# Patient Record
Sex: FEMALE | Race: WHITE | NOT HISPANIC OR LATINO
[De-identification: names, ages, dates, MRNs, and addresses within clinical notes are randomized per-mention and may not be internally consistent; named-entity substitution may affect disease eponyms.]

---

## 2018-07-12 ENCOUNTER — TRANSCRIPTION ENCOUNTER (OUTPATIENT)
Age: 68
End: 2018-07-12

## 2018-07-12 ENCOUNTER — INPATIENT (INPATIENT)
Facility: HOSPITAL | Age: 68
LOS: 4 days | Discharge: HOME | DRG: 340 | End: 2018-07-17
Attending: SPECIALIST | Admitting: SPECIALIST
Payer: MEDICARE

## 2018-07-12 VITALS
OXYGEN SATURATION: 97 % | TEMPERATURE: 99 F | WEIGHT: 164.91 LBS | DIASTOLIC BLOOD PRESSURE: 79 MMHG | RESPIRATION RATE: 16 BRPM | HEART RATE: 86 BPM | SYSTOLIC BLOOD PRESSURE: 144 MMHG

## 2018-07-12 LAB
ALBUMIN SERPL ELPH-MCNC: 3.5 G/DL — SIGNIFICANT CHANGE UP (ref 3.5–5)
ALP SERPL-CCNC: 87 U/L — SIGNIFICANT CHANGE UP (ref 40–120)
ALT FLD-CCNC: 19 U/L DA — SIGNIFICANT CHANGE UP (ref 10–60)
AMYLASE P1 CFR SERPL: 105 U/L — SIGNIFICANT CHANGE UP (ref 25–115)
ANION GAP SERPL CALC-SCNC: 7 MMOL/L — SIGNIFICANT CHANGE UP (ref 5–17)
APPEARANCE UR: CLEAR — SIGNIFICANT CHANGE UP
AST SERPL-CCNC: 19 U/L — SIGNIFICANT CHANGE UP (ref 10–40)
BACTERIA # UR AUTO: ABNORMAL /HPF
BASOPHILS # BLD AUTO: 0 K/UL — SIGNIFICANT CHANGE UP (ref 0–0.2)
BASOPHILS NFR BLD AUTO: 0.3 % — SIGNIFICANT CHANGE UP (ref 0–2)
BILIRUB SERPL-MCNC: 0.6 MG/DL — SIGNIFICANT CHANGE UP (ref 0.2–1.2)
BILIRUB UR-MCNC: NEGATIVE — SIGNIFICANT CHANGE UP
BUN SERPL-MCNC: 13 MG/DL — SIGNIFICANT CHANGE UP (ref 7–18)
CALCIUM SERPL-MCNC: 8.4 MG/DL — SIGNIFICANT CHANGE UP (ref 8.4–10.5)
CHLORIDE SERPL-SCNC: 106 MMOL/L — SIGNIFICANT CHANGE UP (ref 96–108)
CO2 SERPL-SCNC: 25 MMOL/L — SIGNIFICANT CHANGE UP (ref 22–31)
COLOR SPEC: YELLOW — SIGNIFICANT CHANGE UP
CREAT SERPL-MCNC: 1.06 MG/DL — SIGNIFICANT CHANGE UP (ref 0.5–1.3)
DIFF PNL FLD: ABNORMAL
EOSINOPHIL # BLD AUTO: 0 K/UL — SIGNIFICANT CHANGE UP (ref 0–0.5)
EOSINOPHIL NFR BLD AUTO: 0.1 % — SIGNIFICANT CHANGE UP (ref 0–6)
EPI CELLS # UR: SIGNIFICANT CHANGE UP /HPF
GLUCOSE SERPL-MCNC: 140 MG/DL — HIGH (ref 70–99)
GLUCOSE UR QL: NEGATIVE — SIGNIFICANT CHANGE UP
HCT VFR BLD CALC: 39.4 % — SIGNIFICANT CHANGE UP (ref 34.5–45)
HGB BLD-MCNC: 12.9 G/DL — SIGNIFICANT CHANGE UP (ref 11.5–15.5)
KETONES UR-MCNC: ABNORMAL
LEUKOCYTE ESTERASE UR-ACNC: ABNORMAL
LIDOCAIN IGE QN: 145 U/L — SIGNIFICANT CHANGE UP (ref 73–393)
LYMPHOCYTES # BLD AUTO: 1.3 K/UL — SIGNIFICANT CHANGE UP (ref 1–3.3)
LYMPHOCYTES # BLD AUTO: 10.8 % — LOW (ref 13–44)
MCHC RBC-ENTMCNC: 28.8 PG — SIGNIFICANT CHANGE UP (ref 27–34)
MCHC RBC-ENTMCNC: 32.6 GM/DL — SIGNIFICANT CHANGE UP (ref 32–36)
MCV RBC AUTO: 88.4 FL — SIGNIFICANT CHANGE UP (ref 80–100)
MONOCYTES # BLD AUTO: 1.1 K/UL — HIGH (ref 0–0.9)
MONOCYTES NFR BLD AUTO: 8.6 % — SIGNIFICANT CHANGE UP (ref 2–14)
NEUTROPHILS # BLD AUTO: 10 K/UL — HIGH (ref 1.8–7.4)
NEUTROPHILS NFR BLD AUTO: 80.1 % — HIGH (ref 43–77)
NITRITE UR-MCNC: NEGATIVE — SIGNIFICANT CHANGE UP
PH UR: 6 — SIGNIFICANT CHANGE UP (ref 5–8)
PLATELET # BLD AUTO: 160 K/UL — SIGNIFICANT CHANGE UP (ref 150–400)
POTASSIUM SERPL-MCNC: 3.8 MMOL/L — SIGNIFICANT CHANGE UP (ref 3.5–5.3)
POTASSIUM SERPL-SCNC: 3.8 MMOL/L — SIGNIFICANT CHANGE UP (ref 3.5–5.3)
PROT SERPL-MCNC: 7.3 G/DL — SIGNIFICANT CHANGE UP (ref 6–8.3)
PROT UR-MCNC: NEGATIVE — SIGNIFICANT CHANGE UP
RBC # BLD: 4.46 M/UL — SIGNIFICANT CHANGE UP (ref 3.8–5.2)
RBC # FLD: 14.2 % — SIGNIFICANT CHANGE UP (ref 10.3–14.5)
RBC CASTS # UR COMP ASSIST: ABNORMAL /HPF (ref 0–2)
SODIUM SERPL-SCNC: 138 MMOL/L — SIGNIFICANT CHANGE UP (ref 135–145)
SP GR SPEC: 1.01 — SIGNIFICANT CHANGE UP (ref 1.01–1.02)
UROBILINOGEN FLD QL: NEGATIVE — SIGNIFICANT CHANGE UP
WBC # BLD: 12.5 K/UL — HIGH (ref 3.8–10.5)
WBC # FLD AUTO: 12.5 K/UL — HIGH (ref 3.8–10.5)
WBC UR QL: SIGNIFICANT CHANGE UP /HPF (ref 0–5)

## 2018-07-12 RX ORDER — KETOROLAC TROMETHAMINE 30 MG/ML
30 SYRINGE (ML) INJECTION ONCE
Qty: 0 | Refills: 0 | Status: DISCONTINUED | OUTPATIENT
Start: 2018-07-12 | End: 2018-07-12

## 2018-07-12 RX ORDER — SODIUM CHLORIDE 9 MG/ML
3 INJECTION INTRAMUSCULAR; INTRAVENOUS; SUBCUTANEOUS ONCE
Qty: 0 | Refills: 0 | Status: COMPLETED | OUTPATIENT
Start: 2018-07-12 | End: 2018-07-12

## 2018-07-12 RX ADMIN — Medication 30 MILLIGRAM(S): at 22:04

## 2018-07-12 RX ADMIN — SODIUM CHLORIDE 3 MILLILITER(S): 9 INJECTION INTRAMUSCULAR; INTRAVENOUS; SUBCUTANEOUS at 21:32

## 2018-07-12 RX ADMIN — Medication 30 MILLIGRAM(S): at 22:41

## 2018-07-12 NOTE — ED PROVIDER NOTE - PMH
Anxiety    Depression    Diabetes  pt states borderline  GERD (gastroesophageal reflux disease)    Graves disease  tx early 1980s  Hypercholesterolemia  pt states improvement since becoming a vegan  Hypothyroidism  dx 1980 s ; pt states last labs 6/14  Kidney stone  right ; noted recent sonogram ; pt denies c/o  Osteopenia    Spinal stenosis    Tuberculosis  childhood

## 2018-07-12 NOTE — ED PROVIDER NOTE - PSH
H/O left breast biopsy  9/11/2001 pt states "benign"  H/O lipoma  uopper back 1995  S/P D&C (status post dilation and curettage)  1990 s

## 2018-07-12 NOTE — ED ADULT NURSE NOTE - OBJECTIVE STATEMENT
Pt stated," I have pain on my RLQ since yesterday." Pt giving medications per order. Pt not in distress. Denies nausea , vomiting, diarrhea.

## 2018-07-12 NOTE — ED PROVIDER NOTE - OBJECTIVE STATEMENT
68 y/o F pt w/ PMHx of depression, anxiety, DM, tuberculosis, hypothyroidism presents c/o RLQ pain x yesterday. Pain is sharp, constant. Hurts when walking. Associated decreased PO intake. Pt is on zoloft, lithium, synthroid. Denies any other complaints. Allergic to Augmentin, codeine, sulfa.

## 2018-07-13 ENCOUNTER — RESULT REVIEW (OUTPATIENT)
Age: 68
End: 2018-07-13

## 2018-07-13 DIAGNOSIS — K35.80 UNSPECIFIED ACUTE APPENDICITIS: ICD-10-CM

## 2018-07-13 LAB
ANION GAP SERPL CALC-SCNC: 7 MMOL/L — SIGNIFICANT CHANGE UP (ref 5–17)
APTT BLD: 31.7 SEC — SIGNIFICANT CHANGE UP (ref 27.5–37.4)
BASOPHILS # BLD AUTO: 0 K/UL — SIGNIFICANT CHANGE UP (ref 0–0.2)
BASOPHILS NFR BLD AUTO: 0.2 % — SIGNIFICANT CHANGE UP (ref 0–2)
BLD GP AB SCN SERPL QL: SIGNIFICANT CHANGE UP
BUN SERPL-MCNC: 9 MG/DL — SIGNIFICANT CHANGE UP (ref 7–18)
CALCIUM SERPL-MCNC: 8.1 MG/DL — LOW (ref 8.4–10.5)
CHLORIDE SERPL-SCNC: 111 MMOL/L — HIGH (ref 96–108)
CO2 SERPL-SCNC: 24 MMOL/L — SIGNIFICANT CHANGE UP (ref 22–31)
CREAT SERPL-MCNC: 1.01 MG/DL — SIGNIFICANT CHANGE UP (ref 0.5–1.3)
EOSINOPHIL # BLD AUTO: 0 K/UL — SIGNIFICANT CHANGE UP (ref 0–0.5)
EOSINOPHIL NFR BLD AUTO: 0 % — SIGNIFICANT CHANGE UP (ref 0–6)
GLUCOSE SERPL-MCNC: 181 MG/DL — HIGH (ref 70–99)
HCT VFR BLD CALC: 36.3 % — SIGNIFICANT CHANGE UP (ref 34.5–45)
HGB BLD-MCNC: 11.8 G/DL — SIGNIFICANT CHANGE UP (ref 11.5–15.5)
INR BLD: 1.29 RATIO — HIGH (ref 0.88–1.16)
LYMPHOCYTES # BLD AUTO: 0.4 K/UL — LOW (ref 1–3.3)
LYMPHOCYTES # BLD AUTO: 3.8 % — LOW (ref 13–44)
MCHC RBC-ENTMCNC: 28.8 PG — SIGNIFICANT CHANGE UP (ref 27–34)
MCHC RBC-ENTMCNC: 32.5 GM/DL — SIGNIFICANT CHANGE UP (ref 32–36)
MCV RBC AUTO: 88.8 FL — SIGNIFICANT CHANGE UP (ref 80–100)
MONOCYTES # BLD AUTO: 0.5 K/UL — SIGNIFICANT CHANGE UP (ref 0–0.9)
MONOCYTES NFR BLD AUTO: 4.4 % — SIGNIFICANT CHANGE UP (ref 2–14)
NEUTROPHILS # BLD AUTO: 10.8 K/UL — HIGH (ref 1.8–7.4)
NEUTROPHILS NFR BLD AUTO: 91.6 % — HIGH (ref 43–77)
PLATELET # BLD AUTO: 134 K/UL — LOW (ref 150–400)
POTASSIUM SERPL-MCNC: 3.7 MMOL/L — SIGNIFICANT CHANGE UP (ref 3.5–5.3)
POTASSIUM SERPL-SCNC: 3.7 MMOL/L — SIGNIFICANT CHANGE UP (ref 3.5–5.3)
PROTHROM AB SERPL-ACNC: 14.1 SEC — HIGH (ref 9.8–12.7)
RBC # BLD: 4.09 M/UL — SIGNIFICANT CHANGE UP (ref 3.8–5.2)
RBC # FLD: 14 % — SIGNIFICANT CHANGE UP (ref 10.3–14.5)
SODIUM SERPL-SCNC: 142 MMOL/L — SIGNIFICANT CHANGE UP (ref 135–145)
WBC # BLD: 11.8 K/UL — HIGH (ref 3.8–10.5)
WBC # FLD AUTO: 11.8 K/UL — HIGH (ref 3.8–10.5)

## 2018-07-13 PROCEDURE — 44970 LAPAROSCOPY APPENDECTOMY: CPT | Mod: AS

## 2018-07-13 PROCEDURE — 99285 EMERGENCY DEPT VISIT HI MDM: CPT | Mod: 25

## 2018-07-13 PROCEDURE — 74177 CT ABD & PELVIS W/CONTRAST: CPT | Mod: 26

## 2018-07-13 RX ORDER — METRONIDAZOLE 500 MG
500 TABLET ORAL ONCE
Qty: 0 | Refills: 0 | Status: COMPLETED | OUTPATIENT
Start: 2018-07-13 | End: 2018-07-13

## 2018-07-13 RX ORDER — SODIUM CHLORIDE 9 MG/ML
1000 INJECTION, SOLUTION INTRAVENOUS
Qty: 0 | Refills: 0 | Status: DISCONTINUED | OUTPATIENT
Start: 2018-07-13 | End: 2018-07-17

## 2018-07-13 RX ORDER — CIPROFLOXACIN LACTATE 400MG/40ML
400 VIAL (ML) INTRAVENOUS EVERY 12 HOURS
Qty: 0 | Refills: 0 | Status: DISCONTINUED | OUTPATIENT
Start: 2018-07-13 | End: 2018-07-16

## 2018-07-13 RX ORDER — METRONIDAZOLE 500 MG
500 TABLET ORAL EVERY 8 HOURS
Qty: 0 | Refills: 0 | Status: DISCONTINUED | OUTPATIENT
Start: 2018-07-13 | End: 2018-07-16

## 2018-07-13 RX ORDER — ACETAMINOPHEN 500 MG
1000 TABLET ORAL ONCE
Qty: 0 | Refills: 0 | Status: COMPLETED | OUTPATIENT
Start: 2018-07-13 | End: 2018-07-13

## 2018-07-13 RX ORDER — KETOROLAC TROMETHAMINE 30 MG/ML
30 SYRINGE (ML) INJECTION EVERY 6 HOURS
Qty: 0 | Refills: 0 | Status: DISCONTINUED | OUTPATIENT
Start: 2018-07-13 | End: 2018-07-17

## 2018-07-13 RX ORDER — FENTANYL CITRATE 50 UG/ML
25 INJECTION INTRAVENOUS
Qty: 0 | Refills: 0 | Status: DISCONTINUED | OUTPATIENT
Start: 2018-07-13 | End: 2018-07-13

## 2018-07-13 RX ORDER — LITHIUM CARBONATE 300 MG/1
450 TABLET, EXTENDED RELEASE ORAL AT BEDTIME
Qty: 0 | Refills: 0 | Status: DISCONTINUED | OUTPATIENT
Start: 2018-07-13 | End: 2018-07-17

## 2018-07-13 RX ORDER — METRONIDAZOLE 500 MG
500 TABLET ORAL EVERY 8 HOURS
Qty: 0 | Refills: 0 | Status: DISCONTINUED | OUTPATIENT
Start: 2018-07-13 | End: 2018-07-13

## 2018-07-13 RX ORDER — CALCIUM CARBONATE 500(1250)
1 TABLET ORAL
Qty: 0 | Refills: 0 | Status: DISCONTINUED | OUTPATIENT
Start: 2018-07-13 | End: 2018-07-17

## 2018-07-13 RX ORDER — KETOROLAC TROMETHAMINE 30 MG/ML
30 SYRINGE (ML) INJECTION EVERY 6 HOURS
Qty: 0 | Refills: 0 | Status: DISCONTINUED | OUTPATIENT
Start: 2018-07-13 | End: 2018-07-13

## 2018-07-13 RX ORDER — LEVOTHYROXINE SODIUM 125 MCG
150 TABLET ORAL DAILY
Qty: 0 | Refills: 0 | Status: DISCONTINUED | OUTPATIENT
Start: 2018-07-13 | End: 2018-07-17

## 2018-07-13 RX ORDER — CIPROFLOXACIN LACTATE 400MG/40ML
400 VIAL (ML) INTRAVENOUS ONCE
Qty: 0 | Refills: 0 | Status: COMPLETED | OUTPATIENT
Start: 2018-07-13 | End: 2018-07-13

## 2018-07-13 RX ORDER — ACETAMINOPHEN 500 MG
650 TABLET ORAL EVERY 6 HOURS
Qty: 0 | Refills: 0 | Status: DISCONTINUED | OUTPATIENT
Start: 2018-07-13 | End: 2018-07-17

## 2018-07-13 RX ORDER — ONDANSETRON 8 MG/1
4 TABLET, FILM COATED ORAL EVERY 6 HOURS
Qty: 0 | Refills: 0 | Status: DISCONTINUED | OUTPATIENT
Start: 2018-07-13 | End: 2018-07-17

## 2018-07-13 RX ORDER — HEPARIN SODIUM 5000 [USP'U]/ML
5000 INJECTION INTRAVENOUS; SUBCUTANEOUS EVERY 8 HOURS
Qty: 0 | Refills: 0 | Status: DISCONTINUED | OUTPATIENT
Start: 2018-07-13 | End: 2018-07-17

## 2018-07-13 RX ORDER — SERTRALINE 25 MG/1
150 TABLET, FILM COATED ORAL DAILY
Qty: 0 | Refills: 0 | Status: DISCONTINUED | OUTPATIENT
Start: 2018-07-13 | End: 2018-07-17

## 2018-07-13 RX ORDER — DEXTROSE MONOHYDRATE, SODIUM CHLORIDE, AND POTASSIUM CHLORIDE 50; .745; 4.5 G/1000ML; G/1000ML; G/1000ML
1000 INJECTION, SOLUTION INTRAVENOUS
Qty: 0 | Refills: 0 | Status: DISCONTINUED | OUTPATIENT
Start: 2018-07-13 | End: 2018-07-17

## 2018-07-13 RX ADMIN — HEPARIN SODIUM 5000 UNIT(S): 5000 INJECTION INTRAVENOUS; SUBCUTANEOUS at 21:22

## 2018-07-13 RX ADMIN — Medication 1000 MILLIGRAM(S): at 07:23

## 2018-07-13 RX ADMIN — Medication 400 MILLIGRAM(S): at 06:50

## 2018-07-13 RX ADMIN — Medication 500 MILLIGRAM(S): at 21:22

## 2018-07-13 RX ADMIN — HEPARIN SODIUM 5000 UNIT(S): 5000 INJECTION INTRAVENOUS; SUBCUTANEOUS at 05:18

## 2018-07-13 RX ADMIN — Medication 200 MILLIGRAM(S): at 03:44

## 2018-07-13 RX ADMIN — DEXTROSE MONOHYDRATE, SODIUM CHLORIDE, AND POTASSIUM CHLORIDE 125 MILLILITER(S): 50; .745; 4.5 INJECTION, SOLUTION INTRAVENOUS at 06:35

## 2018-07-13 RX ADMIN — Medication 1 TABLET(S): at 17:14

## 2018-07-13 RX ADMIN — HEPARIN SODIUM 5000 UNIT(S): 5000 INJECTION INTRAVENOUS; SUBCUTANEOUS at 17:14

## 2018-07-13 RX ADMIN — Medication 200 MILLIGRAM(S): at 17:15

## 2018-07-13 RX ADMIN — Medication 100 MILLIGRAM(S): at 05:19

## 2018-07-13 RX ADMIN — Medication 400 MILLIGRAM(S): at 12:35

## 2018-07-13 NOTE — H&P ADULT - HISTORY OF PRESENT ILLNESS
66 y/o female with h/o stated "parathyroid" surgery; on zoloft, synthroid and lithium at home; denies any abd surgery  c/o R sided abd pain since Wed night which has remained constant with fever at home; + nausea, no vomiting or diarrhea    < from: CT Abdomen and Pelvis w/ IV Cont (07.13.18 @ 01:27) >  GI tract: Appendix is dilated measuring up to 1.1 cm and fluid-filled   with thickened wall. Associated adjacentextensive periappendiceal   stranding and trace fluid in right lower quadrant, compatible with acute   appendicitis. Appendicolith identified at its base. There are tiny   punctate bubbles of air adjacent to the distal portion of the appendix as   best seen on coronal image 34-35 of series 602, possibility of perforated   appendicitis considered in the differential. No discrete rim-enhancing   collection identified to suggest abscess at this time. Mild wall   thickening of terminal ileum and cecum, likely reactive infectious or   inflammatory process in nature. No evidence of small bowel obstruction.     Peritoneum/retroperitoneum and mesentery: No free air. No organized fluid   collection. Shotty pericecal and retroperitoneal lymph nodes areseen.    < end of copied text >

## 2018-07-14 RX ADMIN — Medication 500 MILLIGRAM(S): at 13:16

## 2018-07-14 RX ADMIN — LITHIUM CARBONATE 450 MILLIGRAM(S): 300 TABLET, EXTENDED RELEASE ORAL at 21:30

## 2018-07-14 RX ADMIN — Medication 200 MILLIGRAM(S): at 17:23

## 2018-07-14 RX ADMIN — Medication 200 MILLIGRAM(S): at 05:50

## 2018-07-14 RX ADMIN — HEPARIN SODIUM 5000 UNIT(S): 5000 INJECTION INTRAVENOUS; SUBCUTANEOUS at 05:50

## 2018-07-14 RX ADMIN — HEPARIN SODIUM 5000 UNIT(S): 5000 INJECTION INTRAVENOUS; SUBCUTANEOUS at 13:16

## 2018-07-14 RX ADMIN — SERTRALINE 150 MILLIGRAM(S): 25 TABLET, FILM COATED ORAL at 21:30

## 2018-07-14 RX ADMIN — Medication 500 MILLIGRAM(S): at 05:50

## 2018-07-14 RX ADMIN — Medication 1 TABLET(S): at 17:23

## 2018-07-14 RX ADMIN — Medication 500 MILLIGRAM(S): at 21:30

## 2018-07-14 RX ADMIN — HEPARIN SODIUM 5000 UNIT(S): 5000 INJECTION INTRAVENOUS; SUBCUTANEOUS at 21:30

## 2018-07-14 RX ADMIN — Medication 150 MICROGRAM(S): at 05:51

## 2018-07-14 RX ADMIN — Medication 1 TABLET(S): at 11:16

## 2018-07-14 RX ADMIN — Medication 1 TABLET(S): at 05:51

## 2018-07-14 NOTE — PROGRESS NOTE ADULT - ATTENDING COMMENTS
As above  POD#1 following lap appe with perforation    Afebrile, comfortable    Abd  soft minimal distention,  No n,V      Tolerated diet      Voiding well, FAVIOLA 60ml      WBC  11,000    Imp  Progressing well    Plan   Continue antibiotics IV

## 2018-07-15 LAB
-  AMIKACIN: SIGNIFICANT CHANGE UP
-  AMOXICILLIN/CLAVULANIC ACID: SIGNIFICANT CHANGE UP
-  AMPICILLIN/SULBACTAM: SIGNIFICANT CHANGE UP
-  AMPICILLIN: SIGNIFICANT CHANGE UP
-  AZTREONAM: SIGNIFICANT CHANGE UP
-  CEFAZOLIN: SIGNIFICANT CHANGE UP
-  CEFEPIME: SIGNIFICANT CHANGE UP
-  CEFOXITIN: SIGNIFICANT CHANGE UP
-  CEFTRIAXONE: SIGNIFICANT CHANGE UP
-  CIPROFLOXACIN: SIGNIFICANT CHANGE UP
-  ERTAPENEM: SIGNIFICANT CHANGE UP
-  GENTAMICIN: SIGNIFICANT CHANGE UP
-  IMIPENEM: SIGNIFICANT CHANGE UP
-  LEVOFLOXACIN: SIGNIFICANT CHANGE UP
-  MEROPENEM: SIGNIFICANT CHANGE UP
-  PIPERACILLIN/TAZOBACTAM: SIGNIFICANT CHANGE UP
-  TOBRAMYCIN: SIGNIFICANT CHANGE UP
-  TRIMETHOPRIM/SULFAMETHOXAZOLE: SIGNIFICANT CHANGE UP
ANION GAP SERPL CALC-SCNC: 6 MMOL/L — SIGNIFICANT CHANGE UP (ref 5–17)
BASOPHILS # BLD AUTO: 0 K/UL — SIGNIFICANT CHANGE UP (ref 0–0.2)
BASOPHILS NFR BLD AUTO: 0.5 % — SIGNIFICANT CHANGE UP (ref 0–2)
BUN SERPL-MCNC: 11 MG/DL — SIGNIFICANT CHANGE UP (ref 7–18)
CALCIUM SERPL-MCNC: 8.2 MG/DL — LOW (ref 8.4–10.5)
CHLORIDE SERPL-SCNC: 109 MMOL/L — HIGH (ref 96–108)
CO2 SERPL-SCNC: 27 MMOL/L — SIGNIFICANT CHANGE UP (ref 22–31)
CREAT SERPL-MCNC: 1.05 MG/DL — SIGNIFICANT CHANGE UP (ref 0.5–1.3)
EOSINOPHIL # BLD AUTO: 0.1 K/UL — SIGNIFICANT CHANGE UP (ref 0–0.5)
EOSINOPHIL NFR BLD AUTO: 0.9 % — SIGNIFICANT CHANGE UP (ref 0–6)
GLUCOSE SERPL-MCNC: 129 MG/DL — HIGH (ref 70–99)
HCT VFR BLD CALC: 34.1 % — LOW (ref 34.5–45)
HGB BLD-MCNC: 10.9 G/DL — LOW (ref 11.5–15.5)
LYMPHOCYTES # BLD AUTO: 1.4 K/UL — SIGNIFICANT CHANGE UP (ref 1–3.3)
LYMPHOCYTES # BLD AUTO: 13.7 % — SIGNIFICANT CHANGE UP (ref 13–44)
MCHC RBC-ENTMCNC: 28.2 PG — SIGNIFICANT CHANGE UP (ref 27–34)
MCHC RBC-ENTMCNC: 31.9 GM/DL — LOW (ref 32–36)
MCV RBC AUTO: 88.6 FL — SIGNIFICANT CHANGE UP (ref 80–100)
METHOD TYPE: SIGNIFICANT CHANGE UP
MONOCYTES # BLD AUTO: 0.8 K/UL — SIGNIFICANT CHANGE UP (ref 0–0.9)
MONOCYTES NFR BLD AUTO: 7.8 % — SIGNIFICANT CHANGE UP (ref 2–14)
NEUTROPHILS # BLD AUTO: 7.6 K/UL — HIGH (ref 1.8–7.4)
NEUTROPHILS NFR BLD AUTO: 77.1 % — HIGH (ref 43–77)
PLATELET # BLD AUTO: 156 K/UL — SIGNIFICANT CHANGE UP (ref 150–400)
POTASSIUM SERPL-MCNC: 3.7 MMOL/L — SIGNIFICANT CHANGE UP (ref 3.5–5.3)
POTASSIUM SERPL-SCNC: 3.7 MMOL/L — SIGNIFICANT CHANGE UP (ref 3.5–5.3)
RBC # BLD: 3.85 M/UL — SIGNIFICANT CHANGE UP (ref 3.8–5.2)
RBC # FLD: 14 % — SIGNIFICANT CHANGE UP (ref 10.3–14.5)
SODIUM SERPL-SCNC: 142 MMOL/L — SIGNIFICANT CHANGE UP (ref 135–145)
WBC # BLD: 9.9 K/UL — SIGNIFICANT CHANGE UP (ref 3.8–10.5)
WBC # FLD AUTO: 9.9 K/UL — SIGNIFICANT CHANGE UP (ref 3.8–10.5)

## 2018-07-15 RX ADMIN — Medication 200 MILLIGRAM(S): at 05:51

## 2018-07-15 RX ADMIN — Medication 500 MILLIGRAM(S): at 22:22

## 2018-07-15 RX ADMIN — Medication 150 MICROGRAM(S): at 05:51

## 2018-07-15 RX ADMIN — Medication 500 MILLIGRAM(S): at 13:23

## 2018-07-15 RX ADMIN — Medication 200 MILLIGRAM(S): at 17:16

## 2018-07-15 RX ADMIN — LITHIUM CARBONATE 450 MILLIGRAM(S): 300 TABLET, EXTENDED RELEASE ORAL at 22:22

## 2018-07-15 RX ADMIN — HEPARIN SODIUM 5000 UNIT(S): 5000 INJECTION INTRAVENOUS; SUBCUTANEOUS at 22:22

## 2018-07-15 RX ADMIN — HEPARIN SODIUM 5000 UNIT(S): 5000 INJECTION INTRAVENOUS; SUBCUTANEOUS at 13:23

## 2018-07-15 RX ADMIN — SERTRALINE 150 MILLIGRAM(S): 25 TABLET, FILM COATED ORAL at 22:22

## 2018-07-15 RX ADMIN — Medication 500 MILLIGRAM(S): at 05:51

## 2018-07-15 RX ADMIN — HEPARIN SODIUM 5000 UNIT(S): 5000 INJECTION INTRAVENOUS; SUBCUTANEOUS at 05:51

## 2018-07-16 ENCOUNTER — TRANSCRIPTION ENCOUNTER (OUTPATIENT)
Age: 68
End: 2018-07-16

## 2018-07-16 LAB
-  AMIKACIN: SIGNIFICANT CHANGE UP
-  AZTREONAM: SIGNIFICANT CHANGE UP
-  CEFEPIME: SIGNIFICANT CHANGE UP
-  CEFTAZIDIME: SIGNIFICANT CHANGE UP
-  CIPROFLOXACIN: SIGNIFICANT CHANGE UP
-  GENTAMICIN: SIGNIFICANT CHANGE UP
-  IMIPENEM: SIGNIFICANT CHANGE UP
-  LEVOFLOXACIN: SIGNIFICANT CHANGE UP
-  MEROPENEM: SIGNIFICANT CHANGE UP
-  PIPERACILLIN/TAZOBACTAM: SIGNIFICANT CHANGE UP
-  TOBRAMYCIN: SIGNIFICANT CHANGE UP
METHOD TYPE: SIGNIFICANT CHANGE UP

## 2018-07-16 RX ORDER — ERTAPENEM SODIUM 1 G/1
1000 INJECTION, POWDER, LYOPHILIZED, FOR SOLUTION INTRAMUSCULAR; INTRAVENOUS ONCE
Qty: 0 | Refills: 0 | Status: COMPLETED | OUTPATIENT
Start: 2018-07-16 | End: 2018-07-16

## 2018-07-16 RX ORDER — ERTAPENEM SODIUM 1 G/1
INJECTION, POWDER, LYOPHILIZED, FOR SOLUTION INTRAMUSCULAR; INTRAVENOUS
Qty: 0 | Refills: 0 | Status: DISCONTINUED | OUTPATIENT
Start: 2018-07-16 | End: 2018-07-17

## 2018-07-16 RX ORDER — SERTRALINE 25 MG/1
3 TABLET, FILM COATED ORAL
Qty: 0 | Refills: 0 | COMMUNITY
Start: 2018-07-16

## 2018-07-16 RX ORDER — LITHIUM CARBONATE 300 MG/1
3 TABLET, EXTENDED RELEASE ORAL
Qty: 0 | Refills: 0 | COMMUNITY
Start: 2018-07-16

## 2018-07-16 RX ORDER — ERTAPENEM SODIUM 1 G/1
1000 INJECTION, POWDER, LYOPHILIZED, FOR SOLUTION INTRAMUSCULAR; INTRAVENOUS EVERY 24 HOURS
Qty: 0 | Refills: 0 | Status: DISCONTINUED | OUTPATIENT
Start: 2018-07-17 | End: 2018-07-17

## 2018-07-16 RX ORDER — ERTAPENEM SODIUM 1 G/1
1 INJECTION, POWDER, LYOPHILIZED, FOR SOLUTION INTRAMUSCULAR; INTRAVENOUS
Qty: 14 | Refills: 0 | OUTPATIENT
Start: 2018-07-16 | End: 2018-07-29

## 2018-07-16 RX ADMIN — ERTAPENEM SODIUM 120 MILLIGRAM(S): 1 INJECTION, POWDER, LYOPHILIZED, FOR SOLUTION INTRAMUSCULAR; INTRAVENOUS at 09:59

## 2018-07-16 RX ADMIN — HEPARIN SODIUM 5000 UNIT(S): 5000 INJECTION INTRAVENOUS; SUBCUTANEOUS at 06:23

## 2018-07-16 RX ADMIN — Medication 200 MILLIGRAM(S): at 06:22

## 2018-07-16 RX ADMIN — HEPARIN SODIUM 5000 UNIT(S): 5000 INJECTION INTRAVENOUS; SUBCUTANEOUS at 13:52

## 2018-07-16 RX ADMIN — HEPARIN SODIUM 5000 UNIT(S): 5000 INJECTION INTRAVENOUS; SUBCUTANEOUS at 21:02

## 2018-07-16 RX ADMIN — Medication 150 MICROGRAM(S): at 06:22

## 2018-07-16 RX ADMIN — Medication 500 MILLIGRAM(S): at 06:22

## 2018-07-16 RX ADMIN — SERTRALINE 150 MILLIGRAM(S): 25 TABLET, FILM COATED ORAL at 21:03

## 2018-07-16 RX ADMIN — LITHIUM CARBONATE 450 MILLIGRAM(S): 300 TABLET, EXTENDED RELEASE ORAL at 21:03

## 2018-07-16 NOTE — DISCHARGE NOTE ADULT - PATIENT PORTAL LINK FT
You can access the [facility] Patient Portal, offered by , by registering with the following website: [email or web address]

## 2018-07-16 NOTE — DISCHARGE NOTE ADULT - HOSPITAL COURSE
66 y/o female with h/o stated "parathyroid" surgery; on zoloft, synthroid and lithium at home; denies any abd surgery  c/o R sided abd pain since Wed night which has remained constant with fever at home;  She was taken to the OR for Laparoscopic Appendectomy on 7/13 and found to have a perforated gangrenous appendix. FAVIOLA drain was kept in place. Her diet was advanced as tolerated and she was cleared for discharge on POD#3 when she was tolerating a regular diet, afebrile, and her WBC were within normal limits. OR surgical swab came back positive for E.coli and pseudomonas and ID consult was called for antibiotic recommendations. Patient discharged on oral antibiotics. 66 y/o female with h/o stated "parathyroid" surgery; on zoloft, synthroid and lithium at home; denies any abd surgery  c/o R sided abd pain since Wed night which has remained constant with fever at home;  She was taken to the OR for Laparoscopic Appendectomy on 7/13 and found to have a perforated gangrenous appendix. FAVIOLA drain was kept in place. Her diet was advanced as tolerated and she was cleared for discharge on POD#3 when she was tolerating a regular diet, afebrile, and her WBC were within normal limits. OR surgical swab came back positive for E.coli and pseudomonas and ID consult was called for antibiotic recommendations. Patient discharged on oral and IV antibiotics.

## 2018-07-16 NOTE — CONSULT NOTE ADULT - SUBJECTIVE AND OBJECTIVE BOX
HPI:  66 y/o female with h/o stated "parathyroid" surgery; on zoloft, synthroid and lithium at home; denies any abd surgery  c/o R sided abd pain since Wed night which has remained constant with fever at home; + nausea, no vomiting or diarrhea        PAST MEDICAL & SURGICAL HISTORY:  Tuberculosis: childhood  Anxiety  Spinal stenosis  Diabetes: pt states borderline  Hypercholesterolemia: pt states improvement since becoming a vegan  GERD (gastroesophageal reflux disease)  Osteopenia  Graves disease: tx early   Hypothyroidism: dx  s ; pt states last labs   Kidney stone: right ; noted recent sonogram ; pt denies c/o  Depression  H/O left breast biopsy: 2001 pt states &quot;benign&quot;  H/O lipoma: uopper back   S/P D&C (status post dilation and curettage): 1990      Augmentin (Rash)  codeine (Rash)  sulfa drugs (Rash)      Meds:  acetaminophen   Tablet 650 milliGRAM(s) Oral every 6 hours PRN  calcium carbonate    500 mG (Tums) Chewable 1 Tablet(s) Chew four times a day  dextrose 5% + sodium chloride 0.9% with potassium chloride 20 mEq/L 1000 milliLiter(s) IV Continuous <Continuous>  ertapenem  IVPB      heparin  Injectable 5000 Unit(s) SubCutaneous every 8 hours  ketorolac   Injectable 30 milliGRAM(s) IV Push every 6 hours PRN  lactated ringers. 1000 milliLiter(s) IV Continuous <Continuous>  levoFLOXacin IVPB 750 milliGRAM(s) IV Intermittent every 24 hours  levothyroxine 150 MICROGram(s) Oral daily  lithium 450 milliGRAM(s) Oral at bedtime  ondansetron Injectable 4 milliGRAM(s) IV Push every 6 hours PRN  sertraline 150 milliGRAM(s) Oral daily      SOCIAL HISTORY:  Smoker:    ETOH use:      FAMILY HISTORY:  Family history unknown: brother; daughter  Family history of coronary artery disease: mother  @ 86 y.o. , father , brother 57 y.o.      VITALS:  Vital Signs Last 24 Hrs  T(C): 37.1 (2018 14:25), Max: 37.5 (15 Jul 2018 22:14)  T(F): 98.8 (2018 14:25), Max: 99.5 (15 Jul 2018 22:14)  HR: 72 (2018 14:25) (72 - 84)  BP: 134/70 (2018 14:25) (127/74 - 135/71)  BP(mean): --  RR: 18 (2018 14:25) (18 - 18)  SpO2: 98% (2018 14:25) (96% - 98%)    LABS/DIAGNOSTIC TESTS:                          10.9   9.9   )-----------( 156      ( 15 Jul 2018 07:10 )             34.1     WBC Count: 9.9 K/uL (07-15 @ 07:10)      07-15    142  |  109<H>  |  11  ----------------------------<  129<H>  3.7   |  27  |  1.05    Ca    8.2<L>      15 Jul 2018 07:10                    LACTATE:    ABG -     CULTURES:   .Surgical Swab periyonel fluid   @ 21:24   Numerous Escherichia coli ESBL  Few Pseudomonas aeruginosa  Few Alpha hemolytic strep "Susceptibilities not performed"  --  Escherichia coli ESBL          RADIOLOGY:< from: CT Abdomen and Pelvis w/ IV Cont (18 @ 01:27) >  EXAM:  CT ABDOMEN AND PELVIS IC                            PROCEDURE DATE:  2018          INTERPRETATION:  CT ABDOMEN AND PELVIS WITH CONTRAST    INDICATION: Right lower quadrant pain.    TECHNIQUE: Contrast enhanced CT of the abdomen and pelvis. Images are   reformatted in the sagittal and coronal planes.    90 mL of Omnipaque 350 contrast material was injected IV.    COMPARISON: None.    FINDINGS:    Lower Thorax: No consolidation or effusion. Mild dependent atelectasis is   seen at thelung bases.    Liver: No suspicious lesions.      Biliary: No dilatation. No radiopaque gallstones visualized within the   gallbladder.  Spleen: No suspicious lesions.      Pancreas: No inflammatory changes or ductal dilatation.      Adrenals: Normal.      Kidneys: No hydronephrosis or solid mass.      Vessels: Normal caliber. Atherosclerotic disease of the aorta and its   branches.    GI tract: Appendix is dilated measuring up to 1.1 cm and fluid-filled   with thickened wall. Associated adjacentextensive periappendiceal   stranding and trace fluid in right lower quadrant, compatible with acute   appendicitis. Appendicolith identified at its base. There are tiny   punctate bubbles of air adjacent to the distal portion of the appendix as   best seen on coronal image 34-35 of series 602, possibility of perforated   appendicitis considered in the differential. No discrete rim-enhancing   collection identified to suggest abscess at this time. Mild wall   thickening of terminal ileum and cecum, likely reactive infectious or   inflammatory process in nature. No evidence of small bowel obstruction.     Peritoneum/retroperitoneum and mesentery: No free air. No organized fluid   collection. Shotty pericecal and retroperitoneal lymph nodes areseen.    Pelvic organs/Bladder: No pelvic masses. Bladder is normal.        Abdominal wall: Unremarkable.  Bones and soft tissues: Multilevel degenerative changes of the spine are   noted. Nonspecific sclerosis at the level of the pubic bones, concerning   for pubis osteitis. Recommend clinical correlation.    IMPRESSION:  Complicated acute appendicitis as described. Tiny punctate bubbles of air   adjacent to the distal portion of the appendix, possibility of perforated   appendicitis considered in the differential. No discrete rim-enhancing   collection identified to suggest abscess at this time. Surgical   consultation and imaging follow-up is advised. Mild wall thickening of   terminal ileum and cecum, likely reactive infectious or inflammatory   process in nature. No evidence of small bowel obstruction.     These critical results were discussed via telephone at 2018 2:06 AM   by Dr. Meneses of radiology with Dr. Castanon, institution read-back   verification policy was followed.         < end of copied text >        ROS  [  ] UNABLE TO ELICIT HPI:  66 y/o female with h/o stated "parathyroid" surgery; on zoloft, synthroid and lithium at home; denies any abd surgery  c/o R sided abd pain since Wed night which has remained constant with fever at home; + nausea, no vomiting or diarrhea.  she is s/p laparoscopic  appendectomy for a perforated appendicitis with minimal contamination but did have some free air in abdomen and is growing out an ESBL E.coli and pseudomonas and alpha hemolytic strep from peritoneal cults, pt is allergic to sulfa.  she is awaiting a PICC line tomorrow and dc home on both IV and po abxs.        PAST MEDICAL & SURGICAL HISTORY:  Tuberculosis: childhood  Anxiety  Spinal stenosis  Diabetes: pt states borderline  Hypercholesterolemia: pt states improvement since becoming a vegan  GERD (gastroesophageal reflux disease)  Osteopenia  Graves disease: tx early   Hypothyroidism: dx 1980 ; pt states last labs   Kidney stone: right ; noted recent sonogram ; pt denies c/o  Depression  H/O left breast biopsy: 2001 pt states &quot;benign&quot;  H/O lipoma: uopper back   S/P D&C (status post dilation and curettage): 1990      Augmentin (Rash)  codeine (Rash)  sulfa drugs (Rash)      Meds:  acetaminophen   Tablet 650 milliGRAM(s) Oral every 6 hours PRN  calcium carbonate    500 mG (Tums) Chewable 1 Tablet(s) Chew four times a day  dextrose 5% + sodium chloride 0.9% with potassium chloride 20 mEq/L 1000 milliLiter(s) IV Continuous <Continuous>  ertapenem  IVPB      heparin  Injectable 5000 Unit(s) SubCutaneous every 8 hours  ketorolac   Injectable 30 milliGRAM(s) IV Push every 6 hours PRN  lactated ringers. 1000 milliLiter(s) IV Continuous <Continuous>  levoFLOXacin IVPB 750 milliGRAM(s) IV Intermittent every 24 hours  levothyroxine 150 MICROGram(s) Oral daily  lithium 450 milliGRAM(s) Oral at bedtime  ondansetron Injectable 4 milliGRAM(s) IV Push every 6 hours PRN  sertraline 150 milliGRAM(s) Oral daily      SOCIAL HISTORY:  Smoker:  no  ETOH use: no      FAMILY HISTORY:  Family history unknown: brother; daughter  Family history of coronary artery disease: mother  @ 86 y.o. , father , brother 57 y.o.      VITALS:  Vital Signs Last 24 Hrs  T(C): 37.1 (2018 14:25), Max: 37.5 (15 Jul 2018 22:14)  T(F): 98.8 (2018 14:25), Max: 99.5 (15 Jul 2018 22:14)  HR: 72 (2018 14:25) (72 - 84)  BP: 134/70 (2018 14:25) (127/74 - 135/71)  BP(mean): --  RR: 18 (2018 14:25) (18 - 18)  SpO2: 98% (2018 14:25) (96% - 98%)    LABS/DIAGNOSTIC TESTS:                          10.9   9.9   )-----------( 156      ( 15 Jul 2018 07:10 )             34.1     WBC Count: 9.9 K/uL (07-15 @ 07:10)      07-15    142  |  109<H>  |  11  ----------------------------<  129<H>  3.7   |  27  |  1.05    Ca    8.2<L>      15 Jul 2018 07:10                    LACTATE:    ABG -     CULTURES:   .Surgical Swab periyonel fluid   @ 21:24   Numerous Escherichia coli ESBL  Few Pseudomonas aeruginosa  Few Alpha hemolytic strep "Susceptibilities not performed"  --  Escherichia coli ESBL          RADIOLOGY:< from: CT Abdomen and Pelvis w/ IV Cont (18 @ 01:27) >  EXAM:  CT ABDOMEN AND PELVIS IC                            PROCEDURE DATE:  2018          INTERPRETATION:  CT ABDOMEN AND PELVIS WITH CONTRAST    INDICATION: Right lower quadrant pain.    TECHNIQUE: Contrast enhanced CT of the abdomen and pelvis. Images are   reformatted in the sagittal and coronal planes.    90 mL of Omnipaque 350 contrast material was injected IV.    COMPARISON: None.    FINDINGS:    Lower Thorax: No consolidation or effusion. Mild dependent atelectasis is   seen at thelung bases.    Liver: No suspicious lesions.      Biliary: No dilatation. No radiopaque gallstones visualized within the   gallbladder.  Spleen: No suspicious lesions.      Pancreas: No inflammatory changes or ductal dilatation.      Adrenals: Normal.      Kidneys: No hydronephrosis or solid mass.      Vessels: Normal caliber. Atherosclerotic disease of the aorta and its   branches.    GI tract: Appendix is dilated measuring up to 1.1 cm and fluid-filled   with thickened wall. Associated adjacentextensive periappendiceal   stranding and trace fluid in right lower quadrant, compatible with acute   appendicitis. Appendicolith identified at its base. There are tiny   punctate bubbles of air adjacent to the distal portion of the appendix as   best seen on coronal image 34-35 of series 602, possibility of perforated   appendicitis considered in the differential. No discrete rim-enhancing   collection identified to suggest abscess at this time. Mild wall   thickening of terminal ileum and cecum, likely reactive infectious or   inflammatory process in nature. No evidence of small bowel obstruction.     Peritoneum/retroperitoneum and mesentery: No free air. No organized fluid   collection. Shotty pericecal and retroperitoneal lymph nodes areseen.    Pelvic organs/Bladder: No pelvic masses. Bladder is normal.        Abdominal wall: Unremarkable.  Bones and soft tissues: Multilevel degenerative changes of the spine are   noted. Nonspecific sclerosis at the level of the pubic bones, concerning   for pubis osteitis. Recommend clinical correlation.    IMPRESSION:  Complicated acute appendicitis as described. Tiny punctate bubbles of air   adjacent to the distal portion of the appendix, possibility of perforated   appendicitis considered in the differential. No discrete rim-enhancing   collection identified to suggest abscess at this time. Surgical   consultation and imaging follow-up is advised. Mild wall thickening of   terminal ileum and cecum, likely reactive infectious or inflammatory   process in nature. No evidence of small bowel obstruction.     These critical results were discussed via telephone at 2018 2:06 AM   by Dr. Meneses of radiology with Dr. Castanon, institution read-back   verification policy was followed.         < end of copied text >        ROS  [  ] UNABLE TO ELICIT

## 2018-07-16 NOTE — CONSULT NOTE ADULT - GASTROINTESTINAL DETAILS
no masses palpable/no rebound tenderness/no guarding/no rigidity/no organomegaly/no distention/bowel sounds normal/soft

## 2018-07-16 NOTE — DISCHARGE NOTE ADULT - MEDICATION SUMMARY - MEDICATIONS TO TAKE
I will START or STAY ON the medications listed below when I get home from the hospital:     mg oral tablet  -- 1 tab(s) by mouth every 6 hours, As Needed -for moderate pain MDD:4 tabs   -- Do not take this drug if you are pregnant.  It is very important that you take or use this exactly as directed.  Do not skip doses or discontinue unless directed by your doctor.  May cause drowsiness or dizziness.  Obtain medical advice before taking any non-prescription drugs as some may affect the action of this medication.  Take with food or milk.    -- Indication: For prn pain     calcium carbonate 500 mg (200 mg elemental calcium) oral tablet, chewable  -- 1 tab(s) by mouth 4 times a day  -- Indication: For calcium     sertraline 50 mg oral tablet  -- 3 tab(s) by mouth once a day  -- Indication: For depression     lithium 150 mg oral capsule  -- 3 cap(s) by mouth once a day (at bedtime)  -- Indication: For mood stabilizer     INVanz 1 g injection  -- 1 gram(s) intravenously every 24 hours Until 7/31 may pull picc after completion of antibiotics   -- Indication: For perf appy     Levaquin 750 mg oral tablet  -- 1 tab(s) by mouth every 24 hours   -- Avoid prolonged or excessive exposure to direct and/or artificial sunlight while taking this medication.  Do not take dairy products, antacids, or iron preparations within one hour of this medication.  Finish all this medication unless otherwise directed by prescriber.  May cause drowsiness or dizziness.  Medication should be taken with plenty of water.    -- Indication: For perf appy

## 2018-07-16 NOTE — DISCHARGE NOTE ADULT - ADDITIONAL INSTRUCTIONS
Please follow up with Dr. Millard in about 1 week. Please call his office to schedule an appointment.

## 2018-07-16 NOTE — DISCHARGE NOTE ADULT - CARE PLAN
Principal Discharge DX:	Acute appendicitis, unspecified acute appendicitis type  Goal:	Resolution of infection  Assessment and plan of treatment:	Drink plenty of fluids. Rest as needed. Do not lift anything heavier than 10 pounds. You may take motrin or advil for mild pain as needed, in addition to prescribed narcotic medication. Call for any fever over 101, nausea, vomiting, severe pain, no passing of gas or bowel movement. You will go home with FAVIOLA drain, empty the drain as needed and record the output, it should be removed at your follow up visit. You may remove outer dressing and shower. Keep white steri-strips in place and allow them to fall off on their own. Pat dry. Please continue to take oral antibiotics as directed. Principal Discharge DX:	Acute appendicitis, unspecified acute appendicitis type  Goal:	Resolution of infection  Assessment and plan of treatment:	Drink plenty of fluids. Rest as needed. Do not lift anything heavier than 10 pounds. You may take motrin or advil for mild pain as needed, in addition to prescribed narcotic medication. Call for any fever over 101, nausea, vomiting, severe pain, no passing of gas or bowel movement. You will go home with FAVIOLA drain, empty the drain as needed and record the output, it should be removed at your follow up visit. You may remove outer dressing and shower. Keep white steri-strips in place and allow them to fall off on their own. Pat dry. Please continue to take oral and IV  antibiotics as directed.

## 2018-07-16 NOTE — DISCHARGE NOTE ADULT - PLAN OF CARE
Resolution of infection Drink plenty of fluids. Rest as needed. Do not lift anything heavier than 10 pounds. You may take motrin or advil for mild pain as needed, in addition to prescribed narcotic medication. Call for any fever over 101, nausea, vomiting, severe pain, no passing of gas or bowel movement. You will go home with FAVIOLA drain, empty the drain as needed and record the output, it should be removed at your follow up visit. You may remove outer dressing and shower. Keep white steri-strips in place and allow them to fall off on their own. Pat dry. Please continue to take oral antibiotics as directed. Drink plenty of fluids. Rest as needed. Do not lift anything heavier than 10 pounds. You may take motrin or advil for mild pain as needed, in addition to prescribed narcotic medication. Call for any fever over 101, nausea, vomiting, severe pain, no passing of gas or bowel movement. You will go home with FAVIOLA drain, empty the drain as needed and record the output, it should be removed at your follow up visit. You may remove outer dressing and shower. Keep white steri-strips in place and allow them to fall off on their own. Pat dry. Please continue to take oral and IV  antibiotics as directed.

## 2018-07-16 NOTE — CONSULT NOTE ADULT - ASSESSMENT
acute perforated appendicitis - s/p appendectomy  peritonitis      plan - cont Invanz 1 gm iv q24hrs  cont levaquin 750mgs po q24hrs  will need both for a total of 14 days  dc planning for tomorrow.

## 2018-07-17 VITALS
OXYGEN SATURATION: 97 % | RESPIRATION RATE: 16 BRPM | SYSTOLIC BLOOD PRESSURE: 125 MMHG | TEMPERATURE: 98 F | HEART RATE: 67 BPM | DIASTOLIC BLOOD PRESSURE: 57 MMHG

## 2018-07-17 PROCEDURE — 71045 X-RAY EXAM CHEST 1 VIEW: CPT | Mod: 26

## 2018-07-17 RX ORDER — IBUPROFEN 200 MG
1 TABLET ORAL
Qty: 20 | Refills: 0 | OUTPATIENT
Start: 2018-07-17 | End: 2018-07-21

## 2018-07-17 RX ORDER — SODIUM CHLORIDE 9 MG/ML
20 INJECTION INTRAMUSCULAR; INTRAVENOUS; SUBCUTANEOUS ONCE
Qty: 0 | Refills: 0 | Status: DISCONTINUED | OUTPATIENT
Start: 2018-07-17 | End: 2018-07-17

## 2018-07-17 RX ORDER — SODIUM CHLORIDE 9 MG/ML
10 INJECTION INTRAMUSCULAR; INTRAVENOUS; SUBCUTANEOUS
Qty: 0 | Refills: 0 | Status: DISCONTINUED | OUTPATIENT
Start: 2018-07-17 | End: 2018-07-17

## 2018-07-17 RX ORDER — SODIUM CHLORIDE 9 MG/ML
10 INJECTION INTRAMUSCULAR; INTRAVENOUS; SUBCUTANEOUS EVERY 12 HOURS
Qty: 0 | Refills: 0 | Status: DISCONTINUED | OUTPATIENT
Start: 2018-07-17 | End: 2018-07-17

## 2018-07-17 RX ADMIN — ERTAPENEM SODIUM 120 MILLIGRAM(S): 1 INJECTION, POWDER, LYOPHILIZED, FOR SOLUTION INTRAMUSCULAR; INTRAVENOUS at 08:40

## 2018-07-17 RX ADMIN — HEPARIN SODIUM 5000 UNIT(S): 5000 INJECTION INTRAVENOUS; SUBCUTANEOUS at 05:38

## 2018-07-17 RX ADMIN — Medication 150 MICROGRAM(S): at 05:39

## 2018-07-17 NOTE — PROGRESS NOTE ADULT - ASSESSMENT
s/p laparoscopic appendectomy 7/13  1. cont abx  2. advance to reg diet  3. f/u AM labs  4. monitor FAVIOLA output  5. d/c planning
67 year old female s.p lap laparoscopic appendectomy, gangrenous    1) cont clears  2) incentive spirometry  3) pain meds as needed  4)oob ambulate encouraged  5) record gale drain output   6) continue antibiotics
68 y/o female s/p Laparoscopic Appendectomy POD #4 with ESBL E. coli from surgical swab    Plan:  1) Continue regular diet  2) PICC line placement today  3) Continue with IV Ertapenem and PO levofloxacin x14 days  3) Ambulate as tolerated  4) d/c planning with FAVIOLA drain
s/p laparoscopic appendectomy for perforated appendicitis POD#2. Leukocytosis resolved. Low grade fever  1. cont abx  2. maintain FAVIOLA drain  3. if remains afebrile in 24 hrs, possible discharge in AM

## 2018-07-17 NOTE — PROGRESS NOTE ADULT - SUBJECTIVE AND OBJECTIVE BOX
66 y/o female s/p laparoscopic appendectomy POD #4    Patient examined at bedside, complaining of mild RLQ abdominal pain and incisional tenderness  No acute events overnight  No nausea, no vomiting  Tolerating diet.  Now on Ertapenem and Levofloxacin        T(F): 98.9 (07-17-18 @ 06:20), Max: 99 (07-16-18 @ 22:09)  HR: 68 (07-17-18 @ 06:20) (68 - 76)  BP: 115/56 (07-17-18 @ 06:20) (108/62 - 134/70)  RR: 16 (07-17-18 @ 06:20) (16 - 18)  SpO2: 97% (07-17-18 @ 06:20) (97% - 98%)  Wt(kg): --      07-16 @ 07:01  -  07-17 @ 07:00  --------------------------------------------------------  IN:  Total IN: 0 mL    OUT:    Bulb: 100 mL  Total OUT: 100 mL    Total NET: -100 mL        Physical Exam  General: AAOx3, No acute distress  Skin: No jaundice, no icterus  Abdomen: soft, tenderness to palpation over the RLQ, nondistended, no rebound tenderness, no guarding, no palpable masses. FAVIOLA drain in place serous fluid, steri strips c/d/i
POD#2 following lap appendectomy for perforated appendicitis      Afebrile, T max 100 OOB comfortable       Abd soft, flat, tolerating diet, + flatus, no N,V        Wound clean dry   FAVIOLA drain 45 ml sero-purulent material        WBC 9000    Imp  Progressing well    Plan  Continue Cipro/Flagyl, drain;  possible in am
Patient seen and examined at bedside with no complaints. Admits to flatus/BM. Denies pain, nasuea/vomiting. Tolerating regular diet.    Vital Signs Last 24 Hrs  T(F): 98.8 (07-16-18 @ 06:07), Max: 99.5 (07-15-18 @ 22:14)  HR: 84 (07-16-18 @ 06:07)  BP: 127/74 (07-16-18 @ 06:07)  RR: 18 (07-16-18 @ 06:07)  SpO2: 96% (07-16-18 @ 06:07)    GENERAL: Alert, NAD  CHEST/LUNG:  respirations nonlabored  ABDOMEN: Dressings c/d/i. FAVIOLA drain in place: 75ml/24hours recorded, serous output. Soft, Nontender, Nondistended    I&O's Detail    15 Jul 2018 07:01  -  16 Jul 2018 07:00  --------------------------------------------------------  IN:  Total IN: 0 mL    OUT:    Bulb: 75 mL  Total OUT: 75 mL    Total NET: -75 mL    LABS:                        10.9   9.9   )-----------( 156      ( 15 Jul 2018 07:10 )             34.1     07-15    142  |  109<H>  |  11  ----------------------------<  129<H>  3.7   |  27  |  1.05    Ca    8.2<L>      15 Jul 2018 07:10    Culture - Surgical Swab (07.13.18 @ 21:24)    -  Amikacin: S <=8    -  Amoxicillin/Clavulanic Acid: S <=8/4    -  Ampicillin: R >16 These ampicillin results predict results for amoxicillin    -  Ampicillin/Sulbactam: R 16/8    -  Aztreonam: R >16    -  Cefazolin: R >16    -  Cefepime: R 8    -  Cefoxitin: S <=4    -  Ceftriaxone: R >32 Enterobacter, Citrobacter, and Serratia may develop resistance during prolonged therapy    -  Ciprofloxacin: R >2    -  Ertapenem: S <=0.5    -  Gentamicin: S <=1    -  Imipenem: S <=1    -  Levofloxacin: R >4    -  Meropenem: S <=1    -  Piperacillin/Tazobactam: R <=8    -  Tobramycin: I 8    -  Trimethoprim/Sulfamethoxazole: S <=0.5/9.5    Specimen Source: .Surgical Swab periyonel fluid    Culture Results:   Numerous Escherichia coli ESBL  Few Pseudomonas aeruginosa  Few Alpha hemolytic strep "Susceptibilities not performed"    Organism Identification: Escherichia coli ESBL    Organism: Escherichia coli ESBL    Method Type: NOHEMI    67y old Female s/p Laparoscopic Appendectomy secondary to gangrenous perforated appendicitis, POD#3. Surgical swab positive for ESBL E. coli and pseudomonas. Now afebrile, stable for discharge    - continue local wound care  - discharge planning home today  - home with FAVIOLA drain  - will get ID consult regarding antibiotic recommendations
Post Operative Day #: 2    SUBJECTIVE: 67y Female s/p lap appendectomy, gangrenous/perforated appendicitis    INTERVAL HPI/OVERNIGHT EVENTS:  Low grade fever last night  Right lower quadrant abdominal pain improved  Tolerating regular diet  Denies nausea or vomiting      MEDICATIONS  (STANDING):  calcium carbonate    500 mG (Tums) Chewable 1 Tablet(s) Chew four times a day  ciprofloxacin   IVPB 400 milliGRAM(s) IV Intermittent every 12 hours  dextrose 5% + sodium chloride 0.9% with potassium chloride 20 mEq/L 1000 milliLiter(s) (125 mL/Hr) IV Continuous <Continuous>  heparin  Injectable 5000 Unit(s) SubCutaneous every 8 hours  lactated ringers. 1000 milliLiter(s) (100 mL/Hr) IV Continuous <Continuous>  levothyroxine 150 MICROGram(s) Oral daily  lithium 450 milliGRAM(s) Oral at bedtime  metroNIDAZOLE    Tablet 500 milliGRAM(s) Oral every 8 hours  sertraline 150 milliGRAM(s) Oral daily    MEDICATIONS  (PRN):  acetaminophen   Tablet 650 milliGRAM(s) Oral every 6 hours PRN For Temp greater than 38 C (100.4 F)  ketorolac   Injectable 30 milliGRAM(s) IV Push every 6 hours PRN Moderate Pain (4 - 6)  ondansetron Injectable 4 milliGRAM(s) IV Push every 6 hours PRN Nausea and/or Vomiting      OBJECTIVE:  Vital Signs Last 24 Hrs  T(C): 37.3 (15 Jul 2018 05:37), Max: 37.8 (14 Jul 2018 21:22)  T(F): 99.1 (15 Jul 2018 05:37), Max: 100.1 (14 Jul 2018 21:22)  HR: 85 (15 Jul 2018 05:37) (85 - 88)  BP: 132/67 (15 Jul 2018 05:37) (119/58 - 138/67)  BP(mean): --  RR: 17 (15 Jul 2018 05:37) (15 - 17)  SpO2: 91% (15 Jul 2018 05:37) (91% - 98%)    Physical Exam:    Gen: awake, alert oriented NAD  Abdomen: surgical wounds dressed, FAVIOLA with serosangunious output, incisional tenderness, RLQ tenderness much improved  Extremities: warm to touch no c/c/e            I&O's Detail    14 Jul 2018 07:01  -  15 Jul 2018 07:00  --------------------------------------------------------  IN:  Total IN: 0 mL    OUT:    Bulb: 45 mL  Total OUT: 45 mL    Total NET: -45 mL      Labs                          10.9   9.9   )-----------( 156      ( 15 Jul 2018 07:10 )             34.1     15 Jul 2018 07:10    142    |  109    |  11     ----------------------------<  129    3.7     |  27     |  1.05   13 Jul 2018 15:16    142    |  111    |  9      ----------------------------<  181    3.7     |  24     |  1.01   12 Jul 2018 21:32    138    |  106    |  13     ----------------------------<  140    3.8     |  25     |  1.06     Ca    8.2        15 Jul 2018 07:10  Ca    8.1        13 Jul 2018 15:16  Ca    8.4        12 Jul 2018 21:32    TPro  7.3    /  Alb  3.5    /  TBili  0.6    /  DBili  x      /  AST  19     /  ALT  19     /  AlkPhos  87     12 Jul 2018 21:32
Post-op check    s/p laparoscopic appendectomy  Patient seen at bedside, AND, mild pain at incision sites, denies nausea or vomiting, tolerated clears    Vital Signs Last 24 Hrs  T(C): 36.8 (13 Jul 2018 16:47), Max: 39.5 (13 Jul 2018 12:02)  T(F): 98.2 (13 Jul 2018 16:47), Max: 103.1 (13 Jul 2018 12:02)  HR: 74 (13 Jul 2018 16:47) (22 - 93)  BP: 108/53 (13 Jul 2018 16:47) (103/89 - 144/79)  RR: 15 (13 Jul 2018 16:47) (15 - 22)  SpO2: 96% (13 Jul 2018 16:47) (95% - 100%)      Abdomen: soft, nondistended, incisions c/d/i, gale in place with serosanguinous fluid  Extremities: no edema, no calf tenderness bilaterally, venodyne boots in place
Post Operative Day #: 1    SUBJECTIVE: 67y Female s/p laparoscopic appendectomy    INTERVAL HPI/OVERNIGHT EVENTS:    Tolerated clears   Last fever yesterday postoperatively, no fever since  Denies nausea, vomiting      MEDICATIONS  (STANDING):  calcium carbonate    500 mG (Tums) Chewable 1 Tablet(s) Chew four times a day  ciprofloxacin   IVPB 400 milliGRAM(s) IV Intermittent every 12 hours  dextrose 5% + sodium chloride 0.9% with potassium chloride 20 mEq/L 1000 milliLiter(s) (125 mL/Hr) IV Continuous <Continuous>  heparin  Injectable 5000 Unit(s) SubCutaneous every 8 hours  lactated ringers. 1000 milliLiter(s) (100 mL/Hr) IV Continuous <Continuous>  levothyroxine 150 MICROGram(s) Oral daily  lithium 450 milliGRAM(s) Oral at bedtime  metroNIDAZOLE    Tablet 500 milliGRAM(s) Oral every 8 hours  sertraline 150 milliGRAM(s) Oral daily    MEDICATIONS  (PRN):  acetaminophen   Tablet 650 milliGRAM(s) Oral every 6 hours PRN For Temp greater than 38 C (100.4 F)  ketorolac   Injectable 30 milliGRAM(s) IV Push every 6 hours PRN Moderate Pain (4 - 6)  ondansetron Injectable 4 milliGRAM(s) IV Push every 6 hours PRN Nausea and/or Vomiting      OBJECTIVE:  Vital Signs Last 24 Hrs  T(C): 36.9 (14 Jul 2018 05:51), Max: 39.5 (13 Jul 2018 12:02)  T(F): 98.5 (14 Jul 2018 05:51), Max: 103.1 (13 Jul 2018 12:02)  HR: 73 (14 Jul 2018 05:51) (22 - 93)  BP: 117/58 (14 Jul 2018 05:51) (103/89 - 122/61)  BP(mean): --  RR: 16 (14 Jul 2018 05:51) (15 - 22)  SpO2: 97% (14 Jul 2018 05:51) (95% - 100%)    Physical Exam:    Gen: awake, alert oriented nAD  Abdomen: surgical wounds dressed, c/d/i.  soft, incisional tenderness, tenderness at RLQ, mildly distended, FAVIOLA serosanguionous  Extremities: warm to touch no c/c/e            I&O's Detail    13 Jul 2018 07:01  -  14 Jul 2018 07:00  --------------------------------------------------------  IN:  Total IN: 0 mL    OUT:    Bulb: 60 mL  Total OUT: 60 mL    Total NET: -60 mL                                11.8   11.8  )-----------( 134      ( 13 Jul 2018 15:16 )             36.3     13 Jul 2018 15:16    142    |  111    |  9      ----------------------------<  181    3.7     |  24     |  1.01   12 Jul 2018 21:32    138    |  106    |  13     ----------------------------<  140    3.8     |  25     |  1.06     Ca    8.1        13 Jul 2018 15:16  Ca    8.4        12 Jul 2018 21:32    TPro  7.3    /  Alb  3.5    /  TBili  0.6    /  DBili  x      /  AST  19     /  ALT  19     /  AlkPhos  87     12 Jul 2018 21:32    PT/INR - ( 13 Jul 2018 02:48 )   PT: 14.1 sec;   INR: 1.29 ratio         PTT - ( 13 Jul 2018 02:48 )  PTT:31.7 sec

## 2018-07-18 LAB — SURGICAL PATHOLOGY FINAL REPORT - CH: SIGNIFICANT CHANGE UP

## 2018-07-20 PROBLEM — Z87.442 HISTORY OF RENAL STONE: Status: RESOLVED | Noted: 2018-07-20 | Resolved: 2018-07-20

## 2018-07-20 PROBLEM — Z87.19 HISTORY OF GASTROESOPHAGEAL REFLUX (GERD): Status: RESOLVED | Noted: 2018-07-20 | Resolved: 2018-07-20

## 2018-07-20 PROBLEM — Z87.39 HISTORY OF SPINAL STENOSIS: Status: RESOLVED | Noted: 2018-07-20 | Resolved: 2018-07-20

## 2018-07-20 PROBLEM — Z86.39 HISTORY OF HIGH CHOLESTEROL: Status: RESOLVED | Noted: 2018-07-20 | Resolved: 2018-07-20

## 2018-07-20 PROBLEM — Z86.39 HISTORY OF DIABETES MELLITUS: Status: RESOLVED | Noted: 2018-07-20 | Resolved: 2018-07-20

## 2018-07-20 PROBLEM — Z86.39 HISTORY OF GRAVES' DISEASE: Status: RESOLVED | Noted: 2018-07-20 | Resolved: 2018-07-20

## 2018-07-20 PROBLEM — Z86.39 HISTORY OF HYPOTHYROIDISM: Status: RESOLVED | Noted: 2018-07-20 | Resolved: 2018-07-20

## 2018-07-20 PROBLEM — Z87.39 HISTORY OF OSTEOPENIA: Status: RESOLVED | Noted: 2018-07-20 | Resolved: 2018-07-20

## 2018-07-20 PROBLEM — R76.11 TB LUNG, LATENT: Status: RESOLVED | Noted: 2018-07-20 | Resolved: 2018-07-20

## 2018-07-20 PROBLEM — Z86.59 HISTORY OF ANXIETY: Status: RESOLVED | Noted: 2018-07-20 | Resolved: 2018-07-20

## 2018-07-20 PROBLEM — Z86.59 HISTORY OF DEPRESSION: Status: RESOLVED | Noted: 2018-07-20 | Resolved: 2018-07-20

## 2018-07-20 LAB
CULTURE RESULTS: SIGNIFICANT CHANGE UP
ORGANISM # SPEC MICROSCOPIC CNT: SIGNIFICANT CHANGE UP
SPECIMEN SOURCE: SIGNIFICANT CHANGE UP

## 2018-07-20 PROCEDURE — 96375 TX/PRO/DX INJ NEW DRUG ADDON: CPT | Mod: 76

## 2018-07-20 PROCEDURE — 36569 INSJ PICC 5 YR+ W/O IMAGING: CPT

## 2018-07-20 PROCEDURE — 99285 EMERGENCY DEPT VISIT HI MDM: CPT | Mod: 25

## 2018-07-20 PROCEDURE — 96372 THER/PROPH/DIAG INJ SC/IM: CPT | Mod: XU

## 2018-07-20 PROCEDURE — C1751: CPT

## 2018-07-20 PROCEDURE — 86850 RBC ANTIBODY SCREEN: CPT

## 2018-07-20 PROCEDURE — 86900 BLOOD TYPING SEROLOGIC ABO: CPT

## 2018-07-20 PROCEDURE — 71045 X-RAY EXAM CHEST 1 VIEW: CPT

## 2018-07-20 PROCEDURE — 76937 US GUIDE VASCULAR ACCESS: CPT | Mod: 26

## 2018-07-20 PROCEDURE — 83690 ASSAY OF LIPASE: CPT

## 2018-07-20 PROCEDURE — 87186 SC STD MICRODIL/AGAR DIL: CPT

## 2018-07-20 PROCEDURE — 96374 THER/PROPH/DIAG INJ IV PUSH: CPT

## 2018-07-20 PROCEDURE — 81001 URINALYSIS AUTO W/SCOPE: CPT

## 2018-07-20 PROCEDURE — 80048 BASIC METABOLIC PNL TOTAL CA: CPT

## 2018-07-20 PROCEDURE — 80053 COMPREHEN METABOLIC PANEL: CPT

## 2018-07-20 PROCEDURE — 85027 COMPLETE CBC AUTOMATED: CPT

## 2018-07-20 PROCEDURE — 76937 US GUIDE VASCULAR ACCESS: CPT

## 2018-07-20 PROCEDURE — 86901 BLOOD TYPING SEROLOGIC RH(D): CPT

## 2018-07-20 PROCEDURE — 85730 THROMBOPLASTIN TIME PARTIAL: CPT

## 2018-07-20 PROCEDURE — 74177 CT ABD & PELVIS W/CONTRAST: CPT

## 2018-07-20 PROCEDURE — 87070 CULTURE OTHR SPECIMN AEROBIC: CPT

## 2018-07-20 PROCEDURE — 82150 ASSAY OF AMYLASE: CPT

## 2018-07-20 PROCEDURE — 85610 PROTHROMBIN TIME: CPT

## 2018-07-24 ENCOUNTER — APPOINTMENT (OUTPATIENT)
Dept: SURGERY | Facility: CLINIC | Age: 68
End: 2018-07-24
Payer: MEDICARE

## 2018-07-24 VITALS
OXYGEN SATURATION: 98 % | HEART RATE: 77 BPM | TEMPERATURE: 98.9 F | HEIGHT: 64 IN | BODY MASS INDEX: 28.17 KG/M2 | DIASTOLIC BLOOD PRESSURE: 77 MMHG | WEIGHT: 165 LBS | SYSTOLIC BLOOD PRESSURE: 109 MMHG

## 2018-07-24 DIAGNOSIS — Z86.59 PERSONAL HISTORY OF OTHER MENTAL AND BEHAVIORAL DISORDERS: ICD-10-CM

## 2018-07-24 DIAGNOSIS — Z87.19 PERSONAL HISTORY OF OTHER DISEASES OF THE DIGESTIVE SYSTEM: ICD-10-CM

## 2018-07-24 DIAGNOSIS — Z86.39 PERSONAL HISTORY OF OTHER ENDOCRINE, NUTRITIONAL AND METABOLIC DISEASE: ICD-10-CM

## 2018-07-24 DIAGNOSIS — Z87.39 PERSONAL HISTORY OF OTHER DISEASES OF THE MUSCULOSKELETAL SYSTEM AND CONNECTIVE TISSUE: ICD-10-CM

## 2018-07-24 DIAGNOSIS — Z87.442 PERSONAL HISTORY OF URINARY CALCULI: ICD-10-CM

## 2018-07-24 DIAGNOSIS — R76.11 NONSPECIFIC REACTION TO TUBERCULIN SKIN TEST W/OUT ACTIVE TUBERCULOSIS: ICD-10-CM

## 2018-07-24 PROCEDURE — 99024 POSTOP FOLLOW-UP VISIT: CPT

## 2018-07-24 RX ORDER — ERTAPENEM SODIUM 1 G/1
1 INJECTION, POWDER, LYOPHILIZED, FOR SOLUTION INTRAVENOUS
Refills: 0 | Status: ACTIVE | COMMUNITY

## 2018-07-24 RX ORDER — LEVOTHYROXINE SODIUM 150 UG/1
150 TABLET ORAL
Refills: 0 | Status: ACTIVE | COMMUNITY

## 2018-07-24 RX ORDER — SERTRALINE HYDROCHLORIDE 25 MG/1
TABLET, FILM COATED ORAL
Refills: 0 | Status: ACTIVE | COMMUNITY

## 2018-07-24 RX ORDER — LEVOFLOXACIN 750 MG/1
750 TABLET, FILM COATED ORAL
Refills: 0 | Status: ACTIVE | COMMUNITY

## 2018-08-07 ENCOUNTER — APPOINTMENT (OUTPATIENT)
Dept: SURGERY | Facility: CLINIC | Age: 68
End: 2018-08-07
Payer: MEDICARE

## 2018-08-07 DIAGNOSIS — K35.2 ACUTE APPENDICITIS WITH GENERALIZED PERITONITIS: ICD-10-CM

## 2018-08-07 PROCEDURE — 99024 POSTOP FOLLOW-UP VISIT: CPT

## 2019-05-03 NOTE — BRIEF OPERATIVE NOTE - PROCEDURE
· Med Name & Dose:   · Glucose lancet  · Test strips  · Last refill was 12-17-18 Number of Refills sent: 1  · Quantity of medication given 100 each  · Last visit for that condition 4-26-19  · Date of next appt: Visit date not found  Date of any Lab results pertaining to medication:   Hemoglobin A1C (%)   Date Value   08/10/2018 6.9 (H)   ·   · Will refill per protocol               <<-----Click on this checkbox to enter Procedure Appendectomy  07/13/2018    Active  [facility]

## 2022-09-22 NOTE — ED PROVIDER NOTE - CARE PLAN
Discussed with patient elevated blood pressure log with [employer] [facility]. Will get labs and 24 hr urine today. Will start twice weekly  testing. Precautions discussed. RTC in 1 week.   
Principal Discharge DX:	Appendicitis, acute

## 2024-03-25 ENCOUNTER — TRANSCRIPTION ENCOUNTER (OUTPATIENT)
Age: 74
End: 2024-03-25

## 2024-03-25 ENCOUNTER — APPOINTMENT (OUTPATIENT)
Dept: CARDIOLOGY | Facility: CLINIC | Age: 74
End: 2024-03-25
Payer: MEDICARE

## 2024-03-25 ENCOUNTER — NON-APPOINTMENT (OUTPATIENT)
Age: 74
End: 2024-03-25

## 2024-03-25 VITALS — OXYGEN SATURATION: 98 % | DIASTOLIC BLOOD PRESSURE: 78 MMHG | HEART RATE: 77 BPM | SYSTOLIC BLOOD PRESSURE: 126 MMHG

## 2024-03-25 VITALS
BODY MASS INDEX: 32.61 KG/M2 | OXYGEN SATURATION: 97 % | HEART RATE: 79 BPM | SYSTOLIC BLOOD PRESSURE: 152 MMHG | TEMPERATURE: 96.5 F | DIASTOLIC BLOOD PRESSURE: 83 MMHG | WEIGHT: 190 LBS

## 2024-03-25 DIAGNOSIS — Z82.3 FAMILY HISTORY OF STROKE: ICD-10-CM

## 2024-03-25 DIAGNOSIS — Z86.59 PERSONAL HISTORY OF OTHER MENTAL AND BEHAVIORAL DISORDERS: ICD-10-CM

## 2024-03-25 DIAGNOSIS — Z82.49 FAMILY HISTORY OF ISCHEMIC HEART DISEASE AND OTHER DISEASES OF THE CIRCULATORY SYSTEM: ICD-10-CM

## 2024-03-25 DIAGNOSIS — F31.70 BIPOLAR DISORDER, CURRENTLY IN REMISSION, MOST RECENT EPISODE UNSPECIFIED: ICD-10-CM

## 2024-03-25 PROCEDURE — G2211 COMPLEX E/M VISIT ADD ON: CPT

## 2024-03-25 PROCEDURE — 99205 OFFICE O/P NEW HI 60 MIN: CPT

## 2024-03-25 PROCEDURE — 93000 ELECTROCARDIOGRAM COMPLETE: CPT

## 2024-03-25 RX ORDER — LITHIUM CARBONATE 300 MG/1
TABLET ORAL
Refills: 0 | Status: ACTIVE | COMMUNITY

## 2024-04-02 ENCOUNTER — OUTPATIENT (OUTPATIENT)
Dept: OUTPATIENT SERVICES | Facility: HOSPITAL | Age: 74
LOS: 1 days | End: 2024-04-02
Payer: MEDICARE

## 2024-04-02 ENCOUNTER — RESULT REVIEW (OUTPATIENT)
Age: 74
End: 2024-04-02

## 2024-04-02 DIAGNOSIS — R06.09 OTHER FORMS OF DYSPNEA: ICD-10-CM

## 2024-04-02 PROCEDURE — 93306 TTE W/DOPPLER COMPLETE: CPT | Mod: 26

## 2024-04-02 PROCEDURE — 93306 TTE W/DOPPLER COMPLETE: CPT

## 2024-05-21 ENCOUNTER — APPOINTMENT (OUTPATIENT)
Dept: PULMONOLOGY | Facility: CLINIC | Age: 74
End: 2024-05-21
Payer: MEDICARE

## 2024-05-21 VITALS
WEIGHT: 187 LBS | OXYGEN SATURATION: 96 % | RESPIRATION RATE: 16 BRPM | SYSTOLIC BLOOD PRESSURE: 144 MMHG | HEART RATE: 81 BPM | DIASTOLIC BLOOD PRESSURE: 83 MMHG | TEMPERATURE: 98.2 F | HEIGHT: 64 IN | BODY MASS INDEX: 31.92 KG/M2

## 2024-05-21 DIAGNOSIS — R06.83 SNORING: ICD-10-CM

## 2024-05-21 DIAGNOSIS — R06.09 OTHER FORMS OF DYSPNEA: ICD-10-CM

## 2024-05-21 DIAGNOSIS — G47.19 OTHER HYPERSOMNIA: ICD-10-CM

## 2024-05-21 PROCEDURE — 94729 DIFFUSING CAPACITY: CPT

## 2024-05-21 PROCEDURE — ZZZZZ: CPT

## 2024-05-21 PROCEDURE — 94010 BREATHING CAPACITY TEST: CPT

## 2024-05-21 PROCEDURE — 94726 PLETHYSMOGRAPHY LUNG VOLUMES: CPT

## 2024-05-21 PROCEDURE — 99204 OFFICE O/P NEW MOD 45 MIN: CPT | Mod: 25

## 2024-05-21 NOTE — ASSESSMENT
[FreeTextEntry1] : CT imaging reviewed  from 2018 - mild atelectasis, was likely in setting of abdominal surgery Lungs clear today  mild BALES appears to be improving PFTs normal study  No risk factors for lung disease and no noted pulmonary pathology Has LVH and diastolic dysfunction on echo, has snoring and daytime sleepiness with ESS>5 - should undergo home sleep study RTC after above

## 2024-05-21 NOTE — HISTORY OF PRESENT ILLNESS
[TextBox_4] : 73F here with sob. Reports sob doing chores at home but can do aerobic exercise without it. Has dry cough, no phlegm, post-nasal drip Saw ENT and was told it was fine. Overall SOB is improving. 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 Lifelong nonsmoker. Former RN. Reports snoring and feeling unrefreshed in morning, ESS>5 and LVH on cardiac echo.

## 2024-05-21 NOTE — CONSULT LETTER
[Dear  ___] : Dear  [unfilled], [Consult Letter:] : I had the pleasure of evaluating your patient, [unfilled]. [Please see my note below.] : Please see my note below. [Consult Closing:] : Thank you very much for allowing me to participate in the care of this patient.  If you have any questions, please do not hesitate to contact me. [Sincerely,] : Sincerely, [FreeTextEntry3] : Amalia Merritt MD, FCCP Chief, Pulmonary Medicine [facility]  of Medicine [employer] [facility]

## 2024-05-23 ENCOUNTER — APPOINTMENT (OUTPATIENT)
Dept: RADIOLOGY | Facility: CLINIC | Age: 74
End: 2024-05-23
Payer: MEDICARE

## 2024-05-23 PROCEDURE — 71046 X-RAY EXAM CHEST 2 VIEWS: CPT
